# Patient Record
Sex: FEMALE | Race: BLACK OR AFRICAN AMERICAN | NOT HISPANIC OR LATINO | URBAN - METROPOLITAN AREA
[De-identification: names, ages, dates, MRNs, and addresses within clinical notes are randomized per-mention and may not be internally consistent; named-entity substitution may affect disease eponyms.]

---

## 2023-10-26 ENCOUNTER — TELEPHONE (OUTPATIENT)
Age: 33
End: 2023-10-26

## 2023-10-26 ENCOUNTER — OFFICE VISIT (OUTPATIENT)
Dept: OBGYN CLINIC | Facility: CLINIC | Age: 33
End: 2023-10-26
Payer: COMMERCIAL

## 2023-10-26 VITALS
WEIGHT: 112 LBS | HEIGHT: 62 IN | DIASTOLIC BLOOD PRESSURE: 80 MMHG | BODY MASS INDEX: 20.61 KG/M2 | SYSTOLIC BLOOD PRESSURE: 100 MMHG

## 2023-10-26 DIAGNOSIS — S92.425A NONDISPLACED FRACTURE OF DISTAL PHALANX OF LEFT GREAT TOE, INITIAL ENCOUNTER FOR CLOSED FRACTURE: Primary | ICD-10-CM

## 2023-10-26 PROCEDURE — 99203 OFFICE O/P NEW LOW 30 MIN: CPT | Performed by: PHYSICIAN ASSISTANT

## 2023-10-26 RX ORDER — MELOXICAM 15 MG/1
15 TABLET ORAL DAILY
Qty: 30 TABLET | Refills: 0 | Status: SHIPPED | OUTPATIENT
Start: 2023-10-26

## 2023-10-26 NOTE — TELEPHONE ENCOUNTER
Caller: Patient    Doctor: Emiliana Pelayo    Reason for call: Calling to schedule and appt for her left foot in the [place] office. Provided dates and times, patient will callback after she calls her ride.     Call back#:n/a

## 2023-10-26 NOTE — PROGRESS NOTES
Patient Name:  Bella Gerard  MRN:  [id number]    Assessment & Plan     Left great toe distal phalanx fracture after injury May 2023. Reviewed radiographs with the patient which do reveal a healed fracture at the base of the distal phalanx of the great toe. She does exhibit some mild residual discomfort and stiffness. Prescription for meloxicam.  Referral to physical therapy for a home exercise program.  Activities as tolerated with modification avoid pain. Recommend evaluation by podiatry in 6 weeks if symptoms persist.    Chief Complaint     Left foot pain    History of the Present Illness     Bella Gerard is a 28 y.o. female who reports to the office today for evaluation of her left foot. Patient sustained an injury to her left foot back in May 2023. She states she was unloading groceries from her car when a frozen turkey fell onto her left foot. She initially noted significant pain swelling and bruising primarily in the region of the great toe. She did rest the foot and utilize ice anti-inflammatories and essential oils with overall improvement. She still however notes persistent discomfort in the left great toe worse with prolonged weightbearing and ambulating. She notes stiffness and great toe range of motion when compared to the contralateral side. She denies any numbness and tingling. She denies any swelling currently. No weakness or instability. No fevers or chills. Physical Exam     /80   Ht 5' 2" (1.575 m)   Wt 50.8 kg (112 lb)   BMI 20.49 kg/m²     Left foot: No gross deformity. Skin intact. No erythema ecchymosis or swelling. Slight discomfort at the great toe IP joint with limited range of motion. No tenderness at the first MTP joint. No tenderness midfoot and hindfoot. Full ankle range of motion without pain. Sensation intact distally. Brisk capillary refill. Eyes: Anicteric sclerae. ENT: Trachea midline. Lungs: Normal respiratory effort.   CV: Capillary refill is less than 2 seconds. Skin: Intact without erythema. Lymph: No palpable lymphadenopathy. Neuro: Sensation is grossly intact to light touch. Psych: Mood and affect are appropriate. Data Review     I have personally reviewed pertinent films in PACS, and my interpretation follows:    X-rays left foot 10/26/2023: Healed nondisplaced fracture at the base of the distal phalanx of the great toe. No significant degenerative changes. No past medical history on file. No past surgical history on file. Not on File    No current outpatient medications on file prior to visit. No current facility-administered medications on file prior to visit. No family history on file. Review of Systems     As stated in the HPI. All other systems reviewed and are negative.